# Patient Record
Sex: FEMALE | Race: BLACK OR AFRICAN AMERICAN | URBAN - METROPOLITAN AREA
[De-identification: names, ages, dates, MRNs, and addresses within clinical notes are randomized per-mention and may not be internally consistent; named-entity substitution may affect disease eponyms.]

---

## 2020-10-30 ENCOUNTER — LAB REQUISITION (OUTPATIENT)
Age: 20
End: 2020-10-30
Payer: COMMERCIAL

## 2020-10-30 DIAGNOSIS — Z20.828 CONTACT WITH AND (SUSPECTED) EXPOSURE TO OTHER VIRAL COMMUNICABLE DISEASES: ICD-10-CM

## 2020-11-02 NOTE — PROGRESS NOTES
Results reviewed and noted to be negative. Appropriate [facility] personnel responsible for documenting and following-up with client notified of test results and need to communicate such results to the client.